# Patient Record
(demographics unavailable — no encounter records)

---

## 2025-04-14 NOTE — REVIEW OF SYSTEMS
[Negative] : Neurological [FreeTextEntry2] : As Per HPI [FreeTextEntry5] : As Per HPI [FreeTextEntry6] : As Per HPI [FreeTextEntry7] : GERD [de-identified] : As Per HPI

## 2025-04-14 NOTE — PHYSICAL EXAM
[Well Developed] : well developed [Well Nourished] : well nourished [No Acute Distress] : no acute distress [Normal Conjunctiva] : normal conjunctiva [Normal Venous Pressure] : normal venous pressure [No Carotid Bruit] : no carotid bruit [Clear Lung Fields] : clear lung fields [Good Air Entry] : good air entry [Soft] : abdomen soft [Non Tender] : non-tender [Normal Gait] : normal gait [No Edema] : no edema [Moves all extremities] : moves all extremities [No Focal Deficits] : no focal deficits [Alert and Oriented] : alert and oriented [Normal Speech] : normal speech [Normal memory] : normal memory [de-identified] : RRR, S1 and S2, no mrg

## 2025-04-14 NOTE — DISCUSSION/SUMMARY
[EKG obtained to assist in diagnosis and management of assessed problem(s)] : EKG obtained to assist in diagnosis and management of assessed problem(s) [FreeTextEntry1] : 1) Exertional Chest tightness and dyspnea - Progressive over the preceding months - Previously underwent recent TTE at [place] with normal LVEF and no significant findings, NST with positive ECG findings for ischemia but negative perfusion - Risk factors for CV disease, intermediate ASCVD risk score - Plan for coronary CTA for further evaluation.  2) HTN - Borderline elevated BP today - Has been well controlled on nifedipine 60mg qD (previously on amlodipine d/c for LE edema, and previously on losaratan) - Consider changing to losartan on RTC if no ischemic etiology for dyspnea identified  3) HLD - Lipid panel 3/2025:, HDL 60, ,  - Intermediate ASCVD risk score - F/u results of coronary CTA - Consider addition of moderate intensity statin on RTC  4) Atrial fibrillation - In NSR today on ECG, recently pharm cardioverted with flecainide - Continue flecainide 50mg BID  - CHADSVASC of 1 (HTN), defer AC at this time.

## 2025-04-14 NOTE — PHYSICAL EXAM
[Well Developed] : well developed [Well Nourished] : well nourished [No Acute Distress] : no acute distress [Normal Conjunctiva] : normal conjunctiva [Normal Venous Pressure] : normal venous pressure [No Carotid Bruit] : no carotid bruit [Clear Lung Fields] : clear lung fields [Good Air Entry] : good air entry [Soft] : abdomen soft [Non Tender] : non-tender [Normal Gait] : normal gait [No Edema] : no edema [Moves all extremities] : moves all extremities [No Focal Deficits] : no focal deficits [Alert and Oriented] : alert and oriented [Normal Speech] : normal speech [Normal memory] : normal memory [de-identified] : RRR, S1 and S2, no mrg

## 2025-04-14 NOTE — DISCUSSION/SUMMARY
[EKG obtained to assist in diagnosis and management of assessed problem(s)] : EKG obtained to assist in diagnosis and management of assessed problem(s) [FreeTextEntry1] : 1) Exertional Chest tightness and dyspnea - Progressive over the preceding months - Previously underwent recent TTE at [place] with normal LVEF and no significant findings, NST with positive ECG findings for ischemia but negative perfusion - Risk factors for CV disease, intermediate ASCVD risk score - Plan for coronary CTA for further evaluation.  2) HTN - Borderline elevated BP today - Has been well controlled on nifedipine 60mg qD (previously on amlodipine d/c for LE edema, and previously on losaratan) - Consider changing to losartan on RTC if no ischemic etiology for dyspnea identified  3) HLD - Lipid panel 3/2025:, HDL 60, ,  - Intermediate ASCVD risk score - F/u results of coronary CTA - Consider addition of moderate intensity statin on RTC  4) Atrial fibrillation - In NSR today on ECG, recently pharm cardioverted with flecainide - Continue flecainide 50mg BID  - CHADSVASC of 1 (HTN), defer AC at this time.

## 2025-04-14 NOTE — HISTORY OF PRESENT ILLNESS
[FreeTextEntry1] : Patient is a 62 year old male with PMH of HLD, GERD, atrial fibrillation currently on flecainide not on AC, anxiety with Hx of panic disorder, HTN, who presents to transition cardiovascular care.  He was previously followed by the [facility] system. Patient is accompanied by his wife.  Patient states that he has been followed by [facility] previously for his HTN and most recently for his progressive exertional dyspnea.  Within the last year, he was started on amlodipine for HTN, however this was complicated by significant b/l LE edema around 12/2024, so he was transitioned to losartan.  Patient tolerated that medication well, however during an ER visit in setting of chest pain (determined to be a panic attack), patient was advised to discontinue his losartan and changed to nifedipine 30mg qD.  This was increased by OSH provider to 60mg qD for better HTN control, and has been on that dose for the past 2+ months.  Patient has also noted progressive exertional dyspnea over the past few months, he believes it has significantly worsened since transitioning to nifedipine and increasing to 60mg.  He underwent TTE without significant abnormalities, and underwent nuclear stress test with ischemic ECG changes with stress but reportedly normal myocardial perfusion.  Patient was previously scheduled for follow up coronary CTA for further evaluation (planned for 4/16) however as patient has moved he would prefer to obtain it here. Of note, patient was recently admitted to [facility] 4/3/25 in setting of afib w/ RVR and was managed with inpatient flecainide with conversion to NSR and has continued on flecainide 50mg BID. He was not started on anticoagulation.    Patient continues to experience exertional and progressive chest pressure with both typical and atypical symptoms, patient also believes there is a component of anxiety to his symptoms.  ECG obtained today with NSR.  Prior Studies: TTE 4/4/2025 ([place]): LVEF 63.8%, Normal LV size and function, normal RV size and function, mild MR, mild TR, normal LV diastolic function. Nuclear stress test 4/1/2025: Ischemic ST changes occurred with stress (2.0mm downsloping and horizontal II, III, aVF and V6 occurring with stress). Mild fixed inferior perfusion defect which improves on prone imaging in the absence of corresponding RWMAs attributed to diaphragmatic attenuation artifact. Labs: 3/24/2025: Lipid panel: , HDL 60, , ; BUN/Cr 13/1.23, AST/ALT 14/22; H/H 15.3/44.6; PLTS 225, A1c 5.5%

## 2025-04-14 NOTE — ASSESSMENT
[FreeTextEntry1] : 62 year old male with PMH of HLD, atrial fibrillation currently on flecainide not on AC, anxiety with Hx of panic disorder, HTN, who presents to transition cardiovascular care. Exertional dyspnea symptoms progressive over the prior few months.  TTE with normal EF no significant abnormalities.  Nuclear stress test with ischemic inferior ECG changes but normal myocardial perfusion.  Will plan for coronary CTA for further evaluation.  Follow up in 1 week with results.  Consider switching BP medications at that time if no ischemic etiologies for symptoms identified. Consider addition of statin on RTC, intermediate 10 year ASCVD risk.

## 2025-04-14 NOTE — REVIEW OF SYSTEMS
[Negative] : Neurological [FreeTextEntry2] : As Per HPI [FreeTextEntry5] : As Per HPI [FreeTextEntry6] : As Per HPI [FreeTextEntry7] : GERD [de-identified] : As Per HPI

## 2025-04-14 NOTE — HISTORY OF PRESENT ILLNESS
[FreeTextEntry1] : Patient is a 62 year old male with PMH of HLD, GERD, atrial fibrillation currently on flecainide not on AC, anxiety with Hx of panic disorder, HTN, who presents to transition cardiovascular care.  He was previously followed by the [facility] system. Patient is accompanied by his wife.  Patient states that he has been followed by [facility] previously for his HTN and most recently for his progressive exertional dyspnea.  Within the last year, he was started on amlodipine for HTN, however this was complicated by significant b/l LE edema around 12/2024, so he was transitioned to losartan.  Patient tolerated that medication well, however during an ER visit in setting of chest pain (determined to be a panic attack), patient was advised to discontinue his losartan and changed to nifedipine 30mg qD.  This was increased by OSH provider to 60mg qD for better HTN control, and has been on that dose for the past 2+ months.  Patient has also noted progressive exertional dyspnea over the past few months, he believes it has significantly worsened since transitioning to nifedipine and increasing to 60mg.  He underwent TTE without significant abnormalities, and underwent nuclear stress test with ischemic ECG changes with stress but reportedly normal myocardial perfusion.  Patient was previously scheduled for follow up coronary CTA for further evaluation (planned for 4/16) however as patient has moved he would prefer to obtain it here. Of note, patient was recently admitted to [facility] 4/3/25 in setting of afib w/ RVR and was managed with inpatient flecainide with conversion to NSR and has continued on flecainide 50mg BID. He was not started on anticoagulation.    Patient continues to experience exertional and progressive chest pressure with both typical and atypical symptoms, patient also believes there is a component of anxiety to his symptoms.  ECG obtained today with NSR.  Prior Studies: TTE 4/4/2025 ([place]): LVEF 63.8%, Normal LV size and function, normal RV size and function, mild MR, mild TR, normal LV diastolic function. Nuclear stress test 4/1/2025: Ischemic ST changes occurred with stress (2.0mm downsloping and horizontal II, III, aVF and V6 occurring with stress). Mild fixed inferior perfusion defect which improves on prone imaging in the absence of corresponding RWMAs attributed to diaphragmatic attenuation artifact. Labs: 3/24/2025: Lipid panel: , HDL 60, , ; BUN/Cr 13/1.23, AST/ALT 14/22; H/H 15.3/44.6; PLTS 225, A1c 5.5%

## 2025-04-25 NOTE — ASSESSMENT
[FreeTextEntry1] : Mr. ARIAS is a 62 year old male referred by Dr. Cr who presents for lung nodules noted on CTA. His past medical history includes HTN, HLD, GERD, atrial fibrillation currently on flecainide not on AC, anxiety with panic disorder   04/23/25: Coronary CTA  Evaluation of the partially imaged lungs demonstrate two 2 mm left upper lobe nodules unchanged since the chest CT of February 20, 2025  I have reviewed the patient's medical records and diagnostic images at time of this office consultation and have made the following recommendation: 1.   All questions answered. Patient verbalized understanding and will follow up accordingly.

## 2025-04-25 NOTE — DATA REVIEWED
[FreeTextEntry1] : Independent review of imaging and independent interpretation was performed at today's visit. 04/23/25: Coronary CTA

## 2025-04-25 NOTE — HISTORY OF PRESENT ILLNESS
[FreeTextEntry1] : Mr. ARIAS is a 62 year old male referred by Dr. Cr who presents for lung nodules noted on CTA.   His past medical history includes HTN, HLD, GERD, atrial fibrillation currently on flecainide not on AC, anxiety with panic disorder   04/23/25: Coronary CTA  Evaluation of the partially imaged lungs demonstrate two 2 mm left upper lobe nodules unchanged since the chest CT of February 20, 2025

## 2025-05-02 NOTE — PHYSICAL EXAM
[Well Developed] : well developed [Well Nourished] : well nourished [No Acute Distress] : no acute distress [Normal Conjunctiva] : normal conjunctiva [Normal Venous Pressure] : normal venous pressure [No Carotid Bruit] : no carotid bruit [Clear Lung Fields] : clear lung fields [Good Air Entry] : good air entry [Soft] : abdomen soft [Non Tender] : non-tender [Normal Gait] : normal gait [No Edema] : no edema [Moves all extremities] : moves all extremities [No Focal Deficits] : no focal deficits [Normal Speech] : normal speech [Alert and Oriented] : alert and oriented [Normal memory] : normal memory [de-identified] : bradycardic, S1 and S2, no mrg

## 2025-05-02 NOTE — ASSESSMENT
[FreeTextEntry1] : 62 year old male with PMH of HLD, atrial fibrillation currently on flecainide not on AC, anxiety with Hx of panic disorder, HTN, who presents for follow up.  Nuclear stress test with ischemic inferior ECG changes but normal myocardial perfusion.  CCTA with calcium score of 0, no significant coronary disease.  Will plan to change BP medication from nifedipine to losartan 25mg qD for BP control.  Patient is currently taking flecainide 50mg BID for atrial fibrillation, is currently in regular rhythm with no alarms on his smart watch for any afib episodes.  Patient was previously on metoprolol but had d/c'd this medication in setting of significant lightheadedness and bradycardia.  Will plan to discontinue nifedipine at this time and start losartan 25mg qD.  Will refer patient to EP for further evaluation and management of atrial fibrillation on flecainide.

## 2025-05-02 NOTE — HISTORY OF PRESENT ILLNESS
[FreeTextEntry1] : Patient is a 62 year old male with PMH of HLD, GERD, paroxysmal atrial fibrillation currently on flecainide not on AC, anxiety with Hx of panic disorder, HTN, who presents for follow up.  Patient underwent CCTA on 4/23 with no evidence of CAD and Ca score of 0.  Incidentally found 2mm JEMMA pulm nodule unchanged since 2/2025.  Patient has gastric reflux and notes that he has decreased appetite associated with weight loss over the past few months.  He is interested in changing his BP medication back to losartan (was briefly on this prior to being changed to nifedipine).  Denies any palpitations and has not had any further episodes of atrial fibrillation as per his apple watch. Of note, he is only on flecainide 50mg BID for afib, was unable to tolerate concurrent metoprolol due to symptomatic bradycardia.   Prior Studies: CCTA 4/23/2025: Calcium score 0, No significant coronary artery disease, two 2 mm left upper lobe nodules unchanged since the chest CT of February 20, 2025 TTE 4/4/2025 ([place]): LVEF 63.8%, Normal LV size and function, normal RV size and function, mild MR, mild TR, normal LV diastolic function. Nuclear stress test 4/1/2025: Ischemic ST changes occurred with stress (2.0mm downsloping and horizontal II, III, aVF and V6 occurring with stress). Mild fixed inferior perfusion defect which improves on prone imaging in the absence of corresponding RWMAs attributed to diaphragmatic attenuation artifact. Labs: 3/24/2025: Lipid panel: , HDL 60, , ; BUN/Cr 13/1.23, AST/ALT 14/22; H/H 15.3/44.6; PLTS 225, A1c 5.5%

## 2025-05-02 NOTE — HISTORY OF PRESENT ILLNESS
[FreeTextEntry1] : Patient is a 62 year old male with PMH of HLD, GERD, paroxysmal atrial fibrillation currently on flecainide not on AC, anxiety with Hx of panic disorder, HTN, who presents for follow up.  Patient underwent CCTA on 4/23 with no evidence of CAD and Ca score of 0.  Incidentally found 2mm JEMMA pulm nodule unchanged since 2/2025.  Patient has gastric reflux and notes that he has decreased appetite associated with weight loss over the past few months.  He is interested in changing his BP medication back to losartan (was briefly on this prior to being changed to nifedipine).  Denies any palpitations and has not had any further episodes of atrial fibrillation as per his apple watch. Of note, he is only on flecainide 50mg BID for afib, was unable to tolerate concurrent metoprolol due to symptomatic bradycardia.   Prior Studies: CCTA 4/23/2025: Calcium score 0, No significant coronary artery disease, two 2 mm left upper lobe nodules unchanged since the chest CT of February 20, 2025 TTE 4/4/2025 ([place]): LVEF 63.8%, Normal LV size and function, normal RV size and function, mild MR, mild TR, normal LV diastolic function. Nuclear stress test 4/1/2025: Ischemic ST changes occurred with stress (2.0mm downsloping and horizontal II, III, aVF and V6 occurring with stress). Mild fixed inferior perfusion defect which improves on prone imaging in the absence of corresponding RWMAs attributed to diaphragmatic attenuation artifact. Labs: 3/24/2025: Lipid panel: , HDL 60, , ; BUN/Cr 13/1.23, AST/ALT 14/22; H/H 15.3/44.6; PLTS 225, A1c 5.5%

## 2025-05-02 NOTE — PHYSICAL EXAM
[Well Developed] : well developed [Well Nourished] : well nourished [No Acute Distress] : no acute distress [Normal Conjunctiva] : normal conjunctiva [Normal Venous Pressure] : normal venous pressure [No Carotid Bruit] : no carotid bruit [Clear Lung Fields] : clear lung fields [Good Air Entry] : good air entry [Soft] : abdomen soft [Non Tender] : non-tender [Normal Gait] : normal gait [No Edema] : no edema [Moves all extremities] : moves all extremities [No Focal Deficits] : no focal deficits [Normal Speech] : normal speech [Alert and Oriented] : alert and oriented [Normal memory] : normal memory [de-identified] : bradycardic, S1 and S2, no mrg

## 2025-05-02 NOTE — DISCUSSION/SUMMARY
[FreeTextEntry1] : 1) Exertional Chest tightness and dyspnea - Improved - Previously underwent recent TTE at [place] with normal LVEF and no significant findings, NST with positive ECG findings for ischemia but negative perfusion - Risk factors for CV disease, intermediate ASCVD risk score - CCTA with calcium score of 0, no obstructive CAD  2) HTN - low normotensive BP today - Ha been well controlled on nifedipine 60mg qD (previously on amlodipine d/c for LE edema, and previously on losartan). - Patient would like change back to previous BP regimen, will d/c nifedipine and start losartan 25mg qD  3) HLD - Lipid panel 3/2025:, HDL 60, ,  - Intermediate ASCVD risk score - CCTA with no significant CAD - Continued lifestyle modification  4) Atrial fibrillation - Regular rhythm, bradycardic, previously pharm cardioverted with flecainide - Continue flecainide 50mg BID - Was previously on metoprolol but self-d/c'd due to symptomatic bradycardia  - CHADSVASC of 1 (HTN), defer AC at this time - Will plan for EP referral for further management of paroxysmal AF  5) Lung nodules - CCTA showed Two 2mm JEMMA nodules, unchanged since CT chest 2/20/2025 - Pulm followup.

## 2025-05-02 NOTE — REVIEW OF SYSTEMS
[Negative] : Neurological [FreeTextEntry2] : As Per HPI [FreeTextEntry5] : As Per HPI [FreeTextEntry6] : As Per HPI [FreeTextEntry7] : GERD [de-identified] : As Per HPI

## 2025-05-02 NOTE — HISTORY OF PRESENT ILLNESS
[FreeTextEntry1] : Patient is a 62 year old male with PMH of HLD, GERD, paroxysmal atrial fibrillation currently on flecainide not on AC, anxiety with Hx of panic disorder, HTN, who presents for follow up.  Patient underwent CCTA on 4/23 with no evidence of CAD and Ca score of 0.  Incidentally found 2mm JEMMA pulm nodule unchanged since 2/2025.  Patient has gastric reflux and notes that he has decreased appetite associated with weight loss over the past few months.  He is interested in changing his BP medication back to losartan (was briefly on this prior to being changed to nifedipine).  Denies any palpitations and has not had any further episodes of atrial fibrillation as per his apple watch. Of note, he is only on flecainide 50mg BID for afib, was unable to tolerate concurrent metoprolol due to symptomatic bradycardia.   Prior Studies: CCTA 4/23/2025: Calcium score 0, No significant coronary artery disease, two 2 mm left upper lobe nodules unchanged since the chest CT of February 20, 2025 TTE 4/4/2025 ([place]): LVEF 63.8%, Normal LV size and function, normal RV size and function, mild MR, mild TR, normal LV diastolic function. Nuclear stress test 4/1/2025: Ischemic ST changes occurred with stress (2.0mm downsloping and horizontal II, III, aVF and V6 occurring with stress). Mild fixed inferior perfusion defect which improves on prone imaging in the absence of corresponding RWMAs attributed to diaphragmatic attenuation artifact. Labs: 3/24/2025: Lipid panel: , HDL 60, , ; BUN/Cr 13/1.23, AST/ALT 14/22; H/H 15.3/44.6; PLTS 225, A1c 5.5%

## 2025-05-02 NOTE — DISCUSSION/SUMMARY
[FreeTextEntry1] : 1) Exertional Chest tightness and dyspnea - Improved - Previously underwent recent TTE at [place] with normal LVEF and no significant findings, NST with positive ECG findings for ischemia but negative perfusion - Risk factors for CV disease, intermediate ASCVD risk score - CCTA with calcium score of 0, no obstructive CAD  2) HTN - low normotensive BP today - Ha been well controlled on nifedipine 60mg qD (previously on amlodipine d/c for LE edema, and previously on losartan). - Patient would like change back to previous BP regimen, will d/c nifedipine and start losartan 25mg qD  3) HLD - Lipid panel 3/2025:, HDL 60, ,  - Intermediate ASCVD risk score - CCTA with no significant CAD - Continued lifestyle modification  4) Atrial fibrillation - Regular rhythm, bradycardic, previously pharm cardioverted with flecainide - Continue flecainide 50mg BID - Was previously on metoprolol but self-d/c'd due to symptomatic bradycardia  - CHADSVASC of 1 (HTN), defer AC at this time - Will plan for EP referral for further management of paroxysmal AF  5) Lung nodules - CCTA showed Two 2mm JEMMA nodules, unchanged since CT chest 2/20/2025 - Pulm followup.

## 2025-05-02 NOTE — REVIEW OF SYSTEMS
[Negative] : Neurological [FreeTextEntry2] : As Per HPI [FreeTextEntry5] : As Per HPI [FreeTextEntry6] : As Per HPI [FreeTextEntry7] : GERD [de-identified] : As Per HPI

## 2025-05-02 NOTE — DISCUSSION/SUMMARY
[FreeTextEntry1] : 1) Exertional Chest tightness and dyspnea - Improved - Previously underwent recent TTE at [place] with normal LVEF and no significant findings, NST with positive ECG findings for ischemia but negative perfusion - Risk factors for CV disease, intermediate ASCVD risk score - CCTA with calcium score of 0, no obstructive CAD  2) HTN - low normotensive BP today - Ha been well controlled on nifedipine 60mg qD (previously on amlodipine d/c for LE edema, and previously on losartan). - Patient would like change back to previous BP regimen, will d/c nifedipine and start losartan 25mg qD  3) HLD - Lipid panel 3/2025:, HDL 60, ,  - Intermediate ASCVD risk score - CCTA with no significant CAD - Continued lifestyle modification  4) Atrial fibrillation - Regular rhythm, bradycardic, previously pharm cardioverted with flecainide - Continue flecainide 50mg BID - Was previously on metoprolol but self-d/c'd due to symptomatic bradycardia  - CHADSVASC of 1 (HTN), defer AC at this time - Will plan for EP referral for further management of paroxysmal AF  5) Lung nodules - CCTA showed Two 2mm JEMMA nodules, unchanged since CT chest 2/20/2025 - Pulm followup.

## 2025-05-02 NOTE — DISCUSSION/SUMMARY
[FreeTextEntry1] : 1) Exertional Chest tightness and dyspnea - Improved - Previously underwent recent TTE at [place] with normal LVEF and no significant findings, NST with positive ECG findings for ischemia but negative perfusion - Risk factors for CV disease, intermediate ASCVD risk score - CCTA with calcium score of 0, no obstructive CAD  2) HTN - low normotensive BP today - Ha been well controlled on nifedipine 60mg qD (previously on amlodipine d/c for LE edema, and previously on losartan). - Patient would like change back to previous BP regimen, will d/c nifedipine and start losartan 25mg qD  3) HLD - Lipid panel 3/2025:, HDL 60, ,  - Intermediate ASCVD risk score - CCTA with no significant CAD - Continued lifestyle modification  4) Atrial fibrillation - Regular rhythm, bradycardic, previously pharm cardioverted with flecainide - Continue flecainide 50mg BID - Was previously on metoprolol but self-d/c'd due to symptomatic bradycardia  - CHADSVASC of 1 (HTN), defer AC at this time - Will plan for EP referral for further management of paroxysmal AF  5) Lung nodules - CCTA showed Two 2mm JEMMA nodules, unchanged since CT chest 2/20/2025 - Pulm followup.

## 2025-05-02 NOTE — DISCUSSION/SUMMARY
[FreeTextEntry1] : 1) Exertional Chest tightness and dyspnea - Improved - Previously underwent recent TTE at [place] with normal LVEF and no significant findings, NST with positive ECG findings for ischemia but negative perfusion - Risk factors for CV disease, intermediate ASCVD risk score - CCTA with calcium score of 0, no obstructive CAD  2) HTN - low normotensive BP today - Ha been well controlled on nifedipine 60mg qD (previously on amlodipine d/c for LE edema, and previously on losartan). - Patient would like change back to previous BP regimen, will d/c nifedipine and start losartan 25mg qD  3) HLD - Lipid panel 3/2025:, HDL 60, ,  - Intermediate ASCVD risk score - CCTA with no significant CAD - Continued lifestyle modification  4) Atrial fibrillation - Regular rhythm, bradycardic, previously pharm cardioverted with flecainide - Continue flecainide 50mg BID - Was previously on metoprolol but self-d/c'd due to symptomatic bradycardia  - CHADSVASC of 1 (HTN), defer AC at this time - Will plan for EP referral for further management of paroxysmal AF  5) Lung nodules - CCTA showed Two 2mm JEMMA nodules, unchanged since CT chest 2/20/2025 - Pulm followup.

## 2025-05-02 NOTE — HISTORY OF PRESENT ILLNESS
[FreeTextEntry1] : Patient is a 62 year old male with PMH of HLD, GERD, paroxysmal atrial fibrillation currently on flecainide not on AC, anxiety with Hx of panic disorder, HTN, who presents for follow up.  Patient underwent CCTA on 4/23 with no evidence of CAD and Ca score of 0.  Incidentally found 2mm JEMMA pulm nodule unchanged since 2/2025.  Patient has gastric reflux and notes that he has decreased appetite associated with weight loss over the past few months.  He is interested in changing his BP medication back to losartan (was briefly on this prior to being changed to nifedipine).  Denies any palpitations and has not had any further episodes of atrial fibrillation as per his apple watch. Of note, he is only on flecainide 50mg BID for afib, was unable to tolerate concurrent metoprolol due to symptomatic bradycardia.   Prior Studies: CCTA 4/23/2025: Calcium score 0, No significant coronary artery disease, two 2 mm left upper lobe nodules unchanged since the chest CT of February 20, 2025 TTE 4/4/2025 ([facility]): LVEF 63.8%, Normal LV size and function, normal RV size and function, mild MR, mild TR, normal LV diastolic function. Nuclear stress test 4/1/2025: Ischemic ST changes occurred with stress (2.0mm downsloping and horizontal II, III, aVF and V6 occurring with stress). Mild fixed inferior perfusion defect which improves on prone imaging in the absence of corresponding RWMAs attributed to diaphragmatic attenuation artifact. Labs: 3/24/2025: Lipid panel: , HDL 60, , ; BUN/Cr 13/1.23, AST/ALT 14/22; H/H 15.3/44.6; PLTS 225, A1c 5.5%

## 2025-05-02 NOTE — PHYSICAL EXAM
[Well Developed] : well developed [Well Nourished] : well nourished [No Acute Distress] : no acute distress [Normal Conjunctiva] : normal conjunctiva [Normal Venous Pressure] : normal venous pressure [No Carotid Bruit] : no carotid bruit [Clear Lung Fields] : clear lung fields [Good Air Entry] : good air entry [Soft] : abdomen soft [Non Tender] : non-tender [Normal Gait] : normal gait [No Edema] : no edema [Moves all extremities] : moves all extremities [No Focal Deficits] : no focal deficits [Normal Speech] : normal speech [Alert and Oriented] : alert and oriented [Normal memory] : normal memory [de-identified] : bradycardic, S1 and S2, no mrg

## 2025-05-02 NOTE — PHYSICAL EXAM
[Well Developed] : well developed [Well Nourished] : well nourished [No Acute Distress] : no acute distress [Normal Conjunctiva] : normal conjunctiva [Normal Venous Pressure] : normal venous pressure [No Carotid Bruit] : no carotid bruit [Clear Lung Fields] : clear lung fields [Good Air Entry] : good air entry [Soft] : abdomen soft [Non Tender] : non-tender [Normal Gait] : normal gait [No Edema] : no edema [Moves all extremities] : moves all extremities [No Focal Deficits] : no focal deficits [Normal Speech] : normal speech [Alert and Oriented] : alert and oriented [Normal memory] : normal memory [de-identified] : bradycardic, S1 and S2, no mrg

## 2025-05-02 NOTE — PHYSICAL EXAM
[Well Developed] : well developed [Well Nourished] : well nourished [No Acute Distress] : no acute distress [Normal Conjunctiva] : normal conjunctiva [Normal Venous Pressure] : normal venous pressure [No Carotid Bruit] : no carotid bruit [Clear Lung Fields] : clear lung fields [Good Air Entry] : good air entry [Soft] : abdomen soft [Non Tender] : non-tender [Normal Gait] : normal gait [No Edema] : no edema [Moves all extremities] : moves all extremities [No Focal Deficits] : no focal deficits [Normal Speech] : normal speech [Alert and Oriented] : alert and oriented [Normal memory] : normal memory [de-identified] : bradycardic, S1 and S2, no mrg

## 2025-05-02 NOTE — HISTORY OF PRESENT ILLNESS
[FreeTextEntry1] : Patient is a 62 year old male with PMH of HLD, GERD, paroxysmal atrial fibrillation currently on flecainide not on AC, anxiety with Hx of panic disorder, HTN, who presents for follow up.  Patient underwent CCTA on 4/23 with no evidence of CAD and Ca score of 0.  Incidentally found 2mm JEMMA pulm nodule unchanged since 2/2025.  Patient has gastric reflux and notes that he has decreased appetite associated with weight loss over the past few months.  He is interested in changing his BP medication back to losartan (was briefly on this prior to being changed to nifedipine).  Denies any palpitations and has not had any further episodes of atrial fibrillation as per his apple watch. Of note, he is only on flecainide 50mg BID for afib, was unable to tolerate concurrent metoprolol due to symptomatic bradycardia.   Prior Studies: CCTA 4/23/2025: Calcium score 0, No significant coronary artery disease, two 2 mm left upper lobe nodules unchanged since the chest CT of February 20, 2025 TTE 4/4/2025 ([place]): LVEF 63.8%, Normal LV size and function, normal RV size and function, mild MR, mild TR, normal LV diastolic function. Nuclear stress test 4/1/2025: Ischemic ST changes occurred with stress (2.0mm downsloping and horizontal II, III, aVF and V6 occurring with stress). Mild fixed inferior perfusion defect which improves on prone imaging in the absence of corresponding RWMAs attributed to diaphragmatic attenuation artifact. Labs: 3/24/2025: Lipid panel: , HDL 60, , ; BUN/Cr 13/1.23, AST/ALT 14/22; H/H 15.3/44.6; PLTS 225, A1c 5.5%

## 2025-05-02 NOTE — REVIEW OF SYSTEMS
[Negative] : Neurological [FreeTextEntry2] : As Per HPI [FreeTextEntry5] : As Per HPI [FreeTextEntry6] : As Per HPI [FreeTextEntry7] : GERD [de-identified] : As Per HPI

## 2025-05-02 NOTE — REVIEW OF SYSTEMS
[Negative] : Neurological [FreeTextEntry2] : As Per HPI [FreeTextEntry5] : As Per HPI [FreeTextEntry6] : As Per HPI [FreeTextEntry7] : GERD [de-identified] : As Per HPI

## 2025-06-27 NOTE — HISTORY OF PRESENT ILLNESS
[FreeTextEntry1] : 62 year old male with PMH of HLD, GERD, paroxysmal atrial fibrillation currently on flecainide not on AC, anxiety with Hx of panic disorder, HTN, who presents for follow up.  Patient underwent CCTA on 4/23 with no evidence of CAD and Ca score of 0.  Incidentally found 2mm JEMMA pulm nodule unchanged since 2/2025.  Patient has gastric reflux and notes that he has decreased appetite associated with weight loss over the past few months.  He is interested in changing his BP medication back to losartan (was briefly on this prior to being changed to nifedipine).  Denies any palpitations and has not had any further episodes of atrial fibrillation as per his apple watch. Of note, he is only on flecainide 50mg BID for afib, was unable to tolerate concurrent metoprolol due to symptomatic bradycardia.   Prior Studies: CCTA 4/23/2025: Calcium score 0, No significant coronary artery disease, two 2 mm left upper lobe nodules unchanged since the chest CT of February 20, 2025 TTE 4/4/2025 ([place]): LVEF 63.8%, Normal LV size and function, normal RV size and function, mild MR, mild TR, normal LV diastolic function. Nuclear stress test 4/1/2025: Ischemic ST changes occurred with stress (2.0mm downsloping and horizontal II, III, aVF and V6 occurring with stress). Mild fixed inferior perfusion defect which improves on prone imaging in the absence of corresponding RWMAs attributed to diaphragmatic attenuation artifact. Labs: 3/24/2025: Lipid panel: , HDL 60, , ; BUN/Cr 13/1.23, AST/ALT 14/22; H/H 15.3/44.6; PLTS 225, A1c 5.5%   The patient is referred for an EP evaluation.

## 2025-06-27 NOTE — PHYSICAL EXAM
[Well Developed] : well developed [Well Nourished] : well nourished [No Acute Distress] : no acute distress [Normal Conjunctiva] : normal conjunctiva [Normal Venous Pressure] : normal venous pressure [No Carotid Bruit] : no carotid bruit [Clear Lung Fields] : clear lung fields [Good Air Entry] : good air entry [Soft] : abdomen soft [Non Tender] : non-tender [Normal Gait] : normal gait [No Edema] : no edema [Moves all extremities] : moves all extremities [No Focal Deficits] : no focal deficits [Normal Speech] : normal speech [Alert and Oriented] : alert and oriented [Normal memory] : normal memory [de-identified] : bradycardic, S1 and S2, no mrg

## 2025-06-27 NOTE — REVIEW OF SYSTEMS
[Negative] : Neurological [FreeTextEntry2] : As Per HPI [FreeTextEntry5] : As Per HPI [FreeTextEntry6] : As Per HPI [FreeTextEntry7] : GERD [de-identified] : As Per HPI

## 2025-06-27 NOTE — DISCUSSION/SUMMARY
[FreeTextEntry1] : CCTA calcium score 0 No evidence of CAD Preserved LVEF  No structural heart diaseas BP well controlled  I recommend that the patient undergo a PVI which he agrees to do however, he wants to delay decison. He wants to take a pill in the pocket approach F/U in 3 minths   I spent 45 minutes with the patient including: 10 minutes preparing for the visit. 25 minutes face to face. 10 minures on documentation and coordination of care.